# Patient Record
Sex: MALE | Race: BLACK OR AFRICAN AMERICAN | Employment: FULL TIME | URBAN - METROPOLITAN AREA
[De-identification: names, ages, dates, MRNs, and addresses within clinical notes are randomized per-mention and may not be internally consistent; named-entity substitution may affect disease eponyms.]

---

## 2023-02-08 ENCOUNTER — OFFICE VISIT (OUTPATIENT)
Dept: FAMILY MEDICINE CLINIC | Age: 43
End: 2023-02-08
Payer: COMMERCIAL

## 2023-02-08 VITALS
SYSTOLIC BLOOD PRESSURE: 126 MMHG | BODY MASS INDEX: 29.69 KG/M2 | HEIGHT: 73 IN | RESPIRATION RATE: 16 BRPM | OXYGEN SATURATION: 93 % | DIASTOLIC BLOOD PRESSURE: 85 MMHG | WEIGHT: 224 LBS | HEART RATE: 106 BPM

## 2023-02-08 DIAGNOSIS — L02.31 ABSCESS OF BUTTOCK: Primary | ICD-10-CM

## 2023-02-08 PROCEDURE — 99213 OFFICE O/P EST LOW 20 MIN: CPT | Performed by: FAMILY MEDICINE

## 2023-02-08 SDOH — ECONOMIC STABILITY: HOUSING INSECURITY
IN THE LAST 12 MONTHS, WAS THERE A TIME WHEN YOU DID NOT HAVE A STEADY PLACE TO SLEEP OR SLEPT IN A SHELTER (INCLUDING NOW)?: NO

## 2023-02-08 SDOH — ECONOMIC STABILITY: FOOD INSECURITY: WITHIN THE PAST 12 MONTHS, THE FOOD YOU BOUGHT JUST DIDN'T LAST AND YOU DIDN'T HAVE MONEY TO GET MORE.: NEVER TRUE

## 2023-02-08 SDOH — ECONOMIC STABILITY: INCOME INSECURITY: HOW HARD IS IT FOR YOU TO PAY FOR THE VERY BASICS LIKE FOOD, HOUSING, MEDICAL CARE, AND HEATING?: NOT VERY HARD

## 2023-02-08 SDOH — ECONOMIC STABILITY: FOOD INSECURITY: WITHIN THE PAST 12 MONTHS, YOU WORRIED THAT YOUR FOOD WOULD RUN OUT BEFORE YOU GOT MONEY TO BUY MORE.: NEVER TRUE

## 2023-02-08 ASSESSMENT — PATIENT HEALTH QUESTIONNAIRE - PHQ9
SUM OF ALL RESPONSES TO PHQ QUESTIONS 1-9: 0
SUM OF ALL RESPONSES TO PHQ9 QUESTIONS 1 & 2: 0
SUM OF ALL RESPONSES TO PHQ QUESTIONS 1-9: 0
1. LITTLE INTEREST OR PLEASURE IN DOING THINGS: 0
SUM OF ALL RESPONSES TO PHQ QUESTIONS 1-9: 0
SUM OF ALL RESPONSES TO PHQ QUESTIONS 1-9: 0
2. FEELING DOWN, DEPRESSED OR HOPELESS: 0

## 2023-02-08 NOTE — PROGRESS NOTES
[address] Note    Date: 2/8/2023                                               Bhumi Owens:     Chief Complaint   Patient presents with    Other     Has a boil on back for 2/3 days, cant sit or hardly walk       HPI  Boil on R buttock starting 2-3 days ago. Getting worse. Hurts to walk and especially to sit. No fever. No n/v. There are no problems to display for this patient. Past Medical History:   Diagnosis Date    Seasonal allergies        No current outpatient medications on file. No current facility-administered medications for this visit. No Known Allergies    Review of Systems  No SOB    Vitals:  /85 (Site: Right Upper Arm, Position: Standing, Cuff Size: Large Adult)   Pulse (!) 106   Resp 16   Ht 6' 1\" (1.854 m)   Wt 224 lb (101.6 kg)   SpO2 93%   BMI 29.55 kg/m²     Wt Readings from Last 3 Encounters:   02/08/23 224 lb (101.6 kg)   01/07/15 237 lb (107.5 kg)   04/18/13 234 lb (106.1 kg)        Physical Exam  General:  Well-appearing, NAD, alert, non-toxic  HEENT:  Normocephalic, atraumatic. CHEST/LUNGS: No dyspnea  EXTREMETIES: Normal movement of all extremities. No edema. No joint swelling. SKIN:  +area of induration and TTP and erythema on R inner buttock, about 8 cm across. Does not abut the anus. +bloody brown drainage notd. PSYCH:  A+O x 3; normal affect  NEURO:  GCS 15, CN2-12 grossly intact, no focal motor/sensory deficits, normal gait, normal speech      Assessment/Plan     82-year-old male with abscess of the right buttock. Given its size, recommend immediate transfer to the emergency room for further evaluation treatment. Likely needs ultrasound-guided drainage/possible CT scan. Patient stated he would go to [facility] emergency room. Discharged in guarded condition at 3:28 PM.    1. Abscess of buttock     No orders of the defined types were placed in this encounter. No follow-ups on file.     Rad Chávez MD, MD 2/8/2023  4:07 PM

## 2023-11-01 ENCOUNTER — OFFICE VISIT (OUTPATIENT)
Dept: FAMILY MEDICINE CLINIC | Age: 43
End: 2023-11-01
Payer: COMMERCIAL

## 2023-11-01 ENCOUNTER — NURSE TRIAGE (OUTPATIENT)
Dept: OTHER | Facility: CLINIC | Age: 43
End: 2023-11-01

## 2023-11-01 VITALS
HEART RATE: 68 BPM | SYSTOLIC BLOOD PRESSURE: 135 MMHG | HEIGHT: 73 IN | WEIGHT: 231 LBS | OXYGEN SATURATION: 96 % | RESPIRATION RATE: 16 BRPM | TEMPERATURE: 97 F | BODY MASS INDEX: 30.62 KG/M2 | DIASTOLIC BLOOD PRESSURE: 98 MMHG

## 2023-11-01 DIAGNOSIS — Z00.00 ANNUAL PHYSICAL EXAM: Primary | ICD-10-CM

## 2023-11-01 DIAGNOSIS — R03.0 ELEVATED BLOOD PRESSURE READING WITHOUT DIAGNOSIS OF HYPERTENSION: ICD-10-CM

## 2023-11-01 DIAGNOSIS — R42 DIZZINESS: ICD-10-CM

## 2023-11-01 LAB
CHP ED QC CHECK: NORMAL
GLUCOSE BLD-MCNC: 115 MG/DL

## 2023-11-01 PROCEDURE — 99213 OFFICE O/P EST LOW 20 MIN: CPT | Performed by: FAMILY MEDICINE

## 2023-11-01 PROCEDURE — 82962 GLUCOSE BLOOD TEST: CPT | Performed by: FAMILY MEDICINE

## 2023-11-01 NOTE — TELEPHONE ENCOUNTER
Location of patient: [facility] call from Flora Alissa at [facility]; Patient with [employer] Complaint requesting to establish care with Fareed DOBSON. Subjective: Caller states \"Possible rxn to sports drink. Dizziness. \"     Current Symptoms:   Sports drink- celsius yesterday on empty stomach. Does not drink caffeine usually (has been for the past week). Lunch (salty food)    Started feeling woozy while eating. Went to [address] but was unable to wait     BP continues to be high, has not had these issues in the past. Still feels lightheaded. /96 this morning, yesterday 150ish/?.    Drives for work. Doing ADLs normally  Wears glasses, vision is at baseline    Denies numbness or weakness, palpitations, difficulty breathing or pain    Onset: Yesterday     Associated Symptoms:  Reduced appetite due to fear of eating will make symptoms worse (RN advised pt to eat to avoid BG drop), staying hydrated  with water    Pain Severity: 0/10    Temperature: Denies    What has been tried: Rest, fluids    Recommended disposition: Go to [facility]. As pt is unestablished, recommended C. Care advice provided, patient verbalizes understanding; denies any other questions or concerns; instructed to call back for any new or worsening symptoms. Patient/Caller agrees with recommended disposition; writer provided warm transfer to Chanel Sorensen at [facility] for appointment scheduling to establish care. Attention Provider: Thank you for allowing me to participate in the care of your patient. The patient was connected to triage in response to information provided to the [facility]. Please do not respond through this encounter as the response is not directed to a shared pool.     Reason for Disposition   Lightheadedness (dizziness) present now, after 2 hours of rest and fluids    Protocols used: Dizziness-ADULT-[place]

## 2024-01-22 ENCOUNTER — TELEPHONE (OUTPATIENT)
Dept: FAMILY MEDICINE CLINIC | Age: 44
End: 2024-01-22

## 2024-01-22 NOTE — TELEPHONE ENCOUNTER
Fine to fill out form based on Dr. Oliva's last appointment with patient in November.  Michela to use his office stamp.

## 2024-01-22 NOTE — TELEPHONE ENCOUNTER
Pt brought in a form Physician Statement for Dr Oliva to sign and fill out.    Form has been scanned into the pt chart    I put form into Dr Oliva's bin downstairs

## 2024-06-24 ENCOUNTER — OFFICE VISIT (OUTPATIENT)
Dept: FAMILY MEDICINE CLINIC | Age: 44
End: 2024-06-24
Payer: COMMERCIAL

## 2024-06-24 VITALS
TEMPERATURE: 98 F | HEART RATE: 90 BPM | OXYGEN SATURATION: 98 % | DIASTOLIC BLOOD PRESSURE: 78 MMHG | SYSTOLIC BLOOD PRESSURE: 130 MMHG | RESPIRATION RATE: 16 BRPM | HEIGHT: 71 IN | BODY MASS INDEX: 33.46 KG/M2 | WEIGHT: 239 LBS

## 2024-06-24 DIAGNOSIS — Z00.00 ANNUAL PHYSICAL EXAM: ICD-10-CM

## 2024-06-24 DIAGNOSIS — Z02.1 PHYSICAL EXAM, PRE-EMPLOYMENT: Primary | ICD-10-CM

## 2024-06-24 DIAGNOSIS — R14.0 ABDOMINAL BLOATING: ICD-10-CM

## 2024-06-24 LAB
BILIRUBIN, POC: NORMAL
BLOOD URINE, POC: NORMAL
CLARITY, POC: NORMAL
COLOR, POC: CLEAR
GLUCOSE URINE, POC: NORMAL
KETONES, POC: NORMAL
LEUKOCYTE EST, POC: NORMAL
NITRITE, POC: NORMAL
PH, POC: 7.5
PROTEIN, POC: NORMAL
SPECIFIC GRAVITY, POC: 1.02
UROBILINOGEN, POC: 0.2

## 2024-06-24 PROCEDURE — 99214 OFFICE O/P EST MOD 30 MIN: CPT | Performed by: FAMILY MEDICINE

## 2024-06-24 PROCEDURE — 99396 PREV VISIT EST AGE 40-64: CPT | Performed by: FAMILY MEDICINE

## 2024-06-24 PROCEDURE — 81002 URINALYSIS NONAUTO W/O SCOPE: CPT | Performed by: FAMILY MEDICINE

## 2024-06-24 RX ORDER — OMEPRAZOLE 20 MG/1
20 CAPSULE, DELAYED RELEASE ORAL
Qty: 30 CAPSULE | Refills: 3 | Status: SHIPPED | OUTPATIENT
Start: 2024-06-24 | End: 2024-06-25

## 2024-06-24 ASSESSMENT — VISUAL ACUITY
OD_CC: 20/20
OS_CC: 20/20

## 2024-06-24 NOTE — PROGRESS NOTES
[facility] Medicine  Clinic Note    Date: 6/24/2024                                               /Objective:     Chief Complaint   Patient presents with    Employment Physical     Employment physical       HPI  Pt is here for annual exam. Last tdap over 10 years ago.    Pt is here for employment physical. 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 Pt denies aches or pains. Is able to carry heavy objects and drive all day long without difficulty. Denies LOC or dizziness or vision problems.    Pt reports excessive bloating after eating for a few years. No lamonte pain, though there is discomfort. +lots of belching. No heartburn. No cough. No vomiting. No diarrhea.       There are no problems to display for this patient.      Past Medical History:   Diagnosis Date    Seasonal allergies        Current Outpatient Medications   Medication Sig Dispense Refill    omeprazole (PRILOSEC) 20 MG delayed release capsule Take 1 capsule by mouth every morning (before breakfast) 30 capsule 3     No current facility-administered medications for this visit.       No Known Allergies    Review of Systems  No CP, no SOB, no rash, no bruise, no HA, no vision change, no ankle swelling, no hearing problems, no LAD, no seizure      Vitals:  /78   Pulse 90   Temp 98 °F (36.7 °C)   Resp 16   Ht 1.803 m (5' 11\")   Wt 108.4 kg (239 lb)   SpO2 98%   BMI 33.33 kg/m²     Wt Readings from Last 3 Encounters:   06/24/24 108.4 kg (239 lb)   11/01/23 104.8 kg (231 lb)   02/08/23 101.6 kg (224 lb)        Physical Exam  General:  Well-appearing, NAD, alert, non-toxic  HEENT:  Normocephalic, atraumatic. Pupils equal and round. TMs pearly with good landmarks. Moist mucous membranes. Normal dentition  NECK:  Supple, normal range of motion, no LAD, no meningeal signs, no JVD, nontender  CHEST/LUNGS: CTAB, no crackles, no wheeze, no rhonchi. Symmetric rise  CARDIOVASCULAR: RRR,  no murmur, no rub  ABDOMEN: Soft,

## 2024-06-25 RX ORDER — OMEPRAZOLE 20 MG/1
20 CAPSULE, DELAYED RELEASE ORAL
Qty: 90 CAPSULE | Refills: 0 | Status: SHIPPED | OUTPATIENT
Start: 2024-06-25

## 2024-06-25 NOTE — TELEPHONE ENCOUNTER
Medication:   Requested Prescriptions     Pending Prescriptions Disp Refills    omeprazole (PRILOSEC) 20 MG delayed release capsule [Pharmacy Med Name: OMEPRAZOLE 20MG CAPSULES] 90 capsule      Sig: TAKE 1 CAPSULE BY MOUTH EVERY MORNING BEFORE BREAKFAST        Last Filled:      Patient Phone Number: [phone] (home) [phone] (work)    Last appt: 6/24/2024   Next appt: Visit date not found    Last OARRS:        No data to display                Preferred Pharmacy:   [id number] [place] - [address] [facility] P [phone] - F [phone]  [address] [place] [id number]  Phone: [phone] Fax: [phone]  Medication:   Requested Prescriptions     Pending Prescriptions Disp Refills    omeprazole (PRILOSEC) 20 MG delayed release capsule [Pharmacy Med Name: OMEPRAZOLE 20MG CAPSULES] 90 capsule      Sig: TAKE 1 CAPSULE BY MOUTH EVERY MORNING BEFORE BREAKFAST        Last Filled:      Patient Phone Number: [phone] (home) [phone] (work)    Last appt: 6/24/2024   Next appt: Visit date not found    Last OARRS:        No data to display                Preferred Pharmacy:   [id number] [place] [facility] [address] P [phone] - F [phone]  [address] [place] [id number]  Phone: [phone] Fax: [phone]

## 2024-12-06 ENCOUNTER — OFFICE VISIT (OUTPATIENT)
Dept: FAMILY MEDICINE CLINIC | Age: 44
End: 2024-12-06

## 2024-12-06 VITALS
SYSTOLIC BLOOD PRESSURE: 138 MMHG | OXYGEN SATURATION: 96 % | DIASTOLIC BLOOD PRESSURE: 99 MMHG | HEART RATE: 90 BPM | RESPIRATION RATE: 20 BRPM | TEMPERATURE: 98 F | BODY MASS INDEX: 33.61 KG/M2 | WEIGHT: 241 LBS

## 2024-12-06 DIAGNOSIS — H91.91 DECREASED HEARING, RIGHT: ICD-10-CM

## 2024-12-06 DIAGNOSIS — H92.01 OTALGIA, RIGHT: Primary | ICD-10-CM

## 2024-12-06 RX ORDER — FLUTICASONE PROPIONATE 50 MCG
1 SPRAY, SUSPENSION (ML) NASAL DAILY
Qty: 16 G | Refills: 2 | Status: SHIPPED | OUTPATIENT
Start: 2024-12-06

## 2024-12-06 RX ORDER — LORATADINE 10 MG/1
10 TABLET ORAL DAILY
Qty: 30 TABLET | Refills: 1 | Status: SHIPPED | OUTPATIENT
Start: 2024-12-06 | End: 2025-02-04

## 2024-12-06 SDOH — ECONOMIC STABILITY: INCOME INSECURITY: HOW HARD IS IT FOR YOU TO PAY FOR THE VERY BASICS LIKE FOOD, HOUSING, MEDICAL CARE, AND HEATING?: NOT VERY HARD

## 2024-12-06 SDOH — ECONOMIC STABILITY: FOOD INSECURITY: WITHIN THE PAST 12 MONTHS, THE FOOD YOU BOUGHT JUST DIDN'T LAST AND YOU DIDN'T HAVE MONEY TO GET MORE.: NEVER TRUE

## 2024-12-06 SDOH — ECONOMIC STABILITY: FOOD INSECURITY: WITHIN THE PAST 12 MONTHS, YOU WORRIED THAT YOUR FOOD WOULD RUN OUT BEFORE YOU GOT MONEY TO BUY MORE.: NEVER TRUE

## 2024-12-06 ASSESSMENT — PATIENT HEALTH QUESTIONNAIRE - PHQ9
1. LITTLE INTEREST OR PLEASURE IN DOING THINGS: NOT AT ALL
2. FEELING DOWN, DEPRESSED OR HOPELESS: NOT AT ALL
SUM OF ALL RESPONSES TO PHQ QUESTIONS 1-9: 0
SUM OF ALL RESPONSES TO PHQ9 QUESTIONS 1 & 2: 0
SUM OF ALL RESPONSES TO PHQ QUESTIONS 1-9: 0

## 2024-12-06 NOTE — PROGRESS NOTES
pressure.  Likely due to impacted cerumen.  Using irrigation and curette, the canal was cleared successfully.  Patient tolerated procedure well without complication.  There was some fluid behind bilateral TMs, so I recommended the patient's start taking Claritin and Flonase if the ears continue to feel muffled in the next 1 to 2 days.    Follow-up for annual physical in 6 months.    Discussed with patient medication/s dosage, instructions, potential S/E, A/R and Drug Interaction  Tylenol or Motrin as needed for pain or fever  Advise to return here if worse or go to nearest ER  Encourage fluids  Pt discharged in stable condition at 14:40      1. Otalgia, right  -     loratadine (CLARITIN) 10 MG tablet; Take 1 tablet by mouth daily, Disp-30 tablet, R-1Normal  -     fluticasone (FLONASE) 50 MCG/ACT nasal spray; 1 spray by Each Nostril route daily, Disp-16 g, R-2Normal  2. Decreased hearing, right       No orders of the defined types were placed in this encounter.      No follow-ups on file.    ROSA ELENA SHAIKH MD, MD    12/6/2024  2:39 PM